# Patient Record
Sex: MALE | Race: WHITE | NOT HISPANIC OR LATINO | URBAN - METROPOLITAN AREA
[De-identification: names, ages, dates, MRNs, and addresses within clinical notes are randomized per-mention and may not be internally consistent; named-entity substitution may affect disease eponyms.]

---
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
## 2022-07-19 NOTE — ED ADULT NURSE NOTE - CAS DISCH CONDITION
Refill request for amphetamine-dextroamphetamine (ADDERALL XR) 20 MG 24 hr capsule LORazepam (ATIVAN) 1 MG tablet    [facility]   Stable

## 2025-06-14 NOTE — ED ADULT NURSE NOTE - NEURO WDL
Alert and oriented to person, place and time, memory intact, behavior appropriate to situation, PERRL.
Universal Safety Interventions